# Patient Record
Sex: MALE | Race: WHITE | URBAN - NONMETROPOLITAN AREA
[De-identification: names, ages, dates, MRNs, and addresses within clinical notes are randomized per-mention and may not be internally consistent; named-entity substitution may affect disease eponyms.]

---

## 2021-01-19 ENCOUNTER — OFFICE VISIT (OUTPATIENT)
Dept: FAMILY MEDICINE CLINIC | Age: 21
End: 2021-01-19
Payer: COMMERCIAL

## 2021-01-19 VITALS
HEIGHT: 69 IN | OXYGEN SATURATION: 98 % | HEART RATE: 60 BPM | SYSTOLIC BLOOD PRESSURE: 122 MMHG | WEIGHT: 294 LBS | DIASTOLIC BLOOD PRESSURE: 78 MMHG | BODY MASS INDEX: 43.55 KG/M2

## 2021-01-19 DIAGNOSIS — R45.89 NEED FOR EMOTIONAL SUPPORT: ICD-10-CM

## 2021-01-19 DIAGNOSIS — F32.A DEPRESSION, UNSPECIFIED DEPRESSION TYPE: ICD-10-CM

## 2021-01-19 DIAGNOSIS — F41.9 ANXIETY: Primary | ICD-10-CM

## 2021-01-19 PROCEDURE — 99215 OFFICE O/P EST HI 40 MIN: CPT | Performed by: INTERNAL MEDICINE

## 2021-01-19 ASSESSMENT — PATIENT HEALTH QUESTIONNAIRE - PHQ9
SUM OF ALL RESPONSES TO PHQ QUESTIONS 1-9: 1
SUM OF ALL RESPONSES TO PHQ QUESTIONS 1-9: 1

## 2021-01-19 NOTE — PROGRESS NOTES
[address]  [address]  Dept: [phone]  Dept Fax: [phone]: [phone]     Visit Date:  1/19/2021    Patient:  Eneida Shane  YOB: 2000    HPI:   Eneida Shane presents today for   Chief Complaint   Patient presents with   Adrianna Bautista Patient     patient is here today to establish and also discuss getting a letter for an emotional support animal.    . HPI 42-year-old male is coming in today to establish care with us as well as requesting letter for an emotional support animal.      He lives in [place] with his girlfriend. Denies any smoking drinking drug abuse there is personal history of depression anxiety in the past currently not on any medications but was on antidepressant/anxiety medications in the past.  He reports sometimes he feels lows but denies any worsening signs and symptoms of depression and no homicidal or suicidal ideations no recent anxiety or panic attacks either. He is not interested today to get started on any medications or see a counselor. He was on meds for a while and took it for 5 to 6 months and stopped almost 1 year ago. He is currently not getting any form of counseling or therapy. He is not on any current medications at this point. No history of substance abuse either. He is requesting a letter for an emotional support animal that he knows absolutely would help him with his chronic symptoms of depression. Medications  Prior to Visit Medications    Not on File        Allergies:  has No Known Allergies. Past Medical History:   has a past medical history of Anxiety and Depression. Past Surgical History   has a past surgical history that includes Tonsillectomy. Family History  family history includes Arthritis in his mother; Diabetes in his mother.     Social History reports that he has never smoked. He has never used smokeless tobacco. He reports current alcohol use of about 3.0 standard drinks of alcohol per week. Health Maintenance:    Health Maintenance   Topic Date Due    Hepatitis C screen  2000    Varicella vaccine (1 of 2 - 2-dose childhood series) 08/31/2000    HPV vaccine (1 - Male 2-dose series) 08/31/2010    HIV screen  08/31/2014    DTaP/Tdap/Td vaccine (1 - Tdap) 08/31/2018    Flu vaccine (1) 09/01/2020    Hepatitis A vaccine  Aged Out    Hepatitis B vaccine  Aged Out    Hib vaccine  Aged Out    Meningococcal (ACWY) vaccine  Aged Out    Pneumococcal 0-64 years Vaccine  Aged Out       Subjective:      Review of Systems   Constitutional: Negative for fatigue, fever and unexpected weight change. HENT: Negative for ear pain, postnasal drip, rhinorrhea, sinus pain, sore throat and trouble swallowing. Eyes: Negative for visual disturbance. Respiratory: Negative for cough, chest tightness and shortness of breath. Cardiovascular: Negative for chest pain and leg swelling. Gastrointestinal: Negative for abdominal pain, blood in stool and diarrhea. Endocrine: Negative for polyuria. Genitourinary: Negative for difficulty urinating and flank pain. Musculoskeletal: Negative for arthralgias, joint swelling and myalgias. Skin: Negative for rash. Allergic/Immunologic: Negative for environmental allergies. Neurological: Negative for weakness, light-headedness, numbness and headaches. Hematological: Negative for adenopathy. Psychiatric/Behavioral: Negative for behavioral problems and suicidal ideas. The patient is not nervous/anxious. Objective:     /78 (Site: Left Upper Arm, Position: Sitting)   Pulse 60   Ht 5' 8.5\" (1.74 m)   Wt 294 lb (133.4 kg)   SpO2 98%   BMI 44.05 kg/m²     Physical Exam  Vitals signs and nursing note reviewed. Constitutional:       Appearance: He is obese.    HENT: Head: Normocephalic and atraumatic. Cardiovascular:      Rate and Rhythm: Normal rate and regular rhythm. Pulses: Normal pulses. Heart sounds: Normal heart sounds. No murmur. No friction rub. No gallop. Pulmonary:      Effort: Pulmonary effort is normal.      Breath sounds: Normal breath sounds. No stridor. Abdominal:      Palpations: Abdomen is soft. Musculoskeletal: Normal range of motion. Skin:     General: Skin is warm. Neurological:      General: No focal deficit present. Mental Status: He is oriented to person, place, and time. Mental status is at baseline. Psychiatric:         Mood and Affect: Mood normal.             Assessment       Diagnosis Orders   1. Anxiety     2. Depression, unspecified depression type     3. Need for emotional support           PLAN   Letter for emotional support animal provided/forms signed. No orders of the defined types were placed in this encounter. No follow-ups on file. Patient given educational materials - see patient instructions. Discussed use, benefit, and side effects of prescribed medications. All patient questions answered. Pt voiced understanding. Reviewed health maintenance.        Electronically signed Zina Tamayo MD on 1/19/2021 at 3:31 PM EST

## 2021-01-22 ASSESSMENT — ENCOUNTER SYMPTOMS
SHORTNESS OF BREATH: 0
SINUS PAIN: 0
BLOOD IN STOOL: 0
DIARRHEA: 0
ABDOMINAL PAIN: 0
RHINORRHEA: 0
SORE THROAT: 0
CHEST TIGHTNESS: 0
TROUBLE SWALLOWING: 0
COUGH: 0

## 2023-12-28 ENCOUNTER — NURSE ONLY (OUTPATIENT)
Dept: FAMILY MEDICINE CLINIC | Age: 23
End: 2023-12-28

## 2024-01-01 ENCOUNTER — TELEPHONE (OUTPATIENT)
Dept: FAMILY MEDICINE CLINIC | Age: 24
End: 2024-01-01

## 2024-01-01 RX ORDER — BENZONATATE 200 MG/1
200 CAPSULE ORAL 3 TIMES DAILY PRN
Qty: 30 CAPSULE | Refills: 0 | Status: SHIPPED | OUTPATIENT
Start: 2024-01-01 | End: 2024-01-11

## 2024-01-02 NOTE — TELEPHONE ENCOUNTER
Visiting from [place] nad has a cold with persistent cough. HAs had the tesslon perles in the pat and requesting a refill,  Script called to [facility] in [place].